# Patient Record
Sex: FEMALE | Race: WHITE | URBAN - METROPOLITAN AREA
[De-identification: names, ages, dates, MRNs, and addresses within clinical notes are randomized per-mention and may not be internally consistent; named-entity substitution may affect disease eponyms.]

---

## 2018-04-11 ENCOUNTER — HISTORICAL (OUTPATIENT)
Dept: ADMINISTRATIVE | Facility: HOSPITAL | Age: 48
End: 2018-04-11

## 2018-05-31 ENCOUNTER — HISTORICAL (OUTPATIENT)
Dept: ADMINISTRATIVE | Facility: HOSPITAL | Age: 48
End: 2018-05-31

## 2022-04-30 NOTE — ED PROVIDER NOTES
Patient:   ZAMZAM Brooks             MRN: [id number]            FIN: [id number]               Age:   48 years     Sex:  Female     :  1970   Associated Diagnoses:   Neck pain; Neck muscle spasm; Anxiety   Author:   Clinton Foreman      Basic Information   Additional information: Chief Complaint from Nursing Triage Note : Chief Complaint   2018 18:12 CDT      Chief Complaint           PT W CO CONTINUED NECK PAIN, SEEN LAST WK. NOT  IMPROVING W RX MEDS. WORSE AT NIGHT.  PT ALSO W INCREASE STRESS AT WORK CAUSING INCREASE IN ANXITY.  .      History of Present Illness   The patient presents with neck injury and neck pain.  The onset was 1  weeks ago and chronic.  The course/duration of symptoms is constant.  Location: Right posterior lower neck. Type of injury: none.  The location where the incident occurred was at home.  Radiating pain To the right upper extremity.  .  The character of symptoms is pain and radiating pain: to the right and upper extremity.  The degree at present is minimal.  There are exacerbating factors including movement and turning head.  The relieving factor is none.  Risk factors consist of none.  Prior episodes: chronic.  Therapy today: none.  Associated symptoms: denies headache, denies dizziness, denies focal weakness, denies altered sensation, denies nausea and denies vomiting.  49 YO female in ED with complaints of right sided posterior neck pain for the last week or so with intermittent radiation down the right upper extremity. States she has a history of herniated disc in her neck. Denies any injury or trauma, fever, chills, chest pain, SOB, abdominal pain, N/v/D, HA or dizziness. Also having anxiety due to extra stress at work. No other complaints. .        Review of Systems   Constitutional symptoms:  Negative except as documented in HPI.   Skin symptoms:  Negative except as documented in HPI.   Eye symptoms:  Negative except as documented in HPI.   ENMT symptoms:   Negative except as documented in HPI.   Respiratory symptoms:  Negative except as documented in HPI.   Cardiovascular symptoms:  Negative except as documented in HPI.   Gastrointestinal symptoms:  Negative except as documented in HPI.   Musculoskeletal symptoms:  Negative except as documented in HPI.   Neurologic symptoms:  Negative except as documented in HPI.   Psychiatric symptoms:  Negative except as documented in HPI.             Additional review of systems information: All other systems reviewed and otherwise negative.      Health Status   Allergies:    Allergic Reactions (Selected)  No Known Allergies,    Allergies (1) Active Reaction  No Known Allergies None Documented  .   Medications:  (Selected)   Prescriptions  Prescribed  baclofen 10 mg oral tablet: 10 mg = 1 tab(s), Oral, TID, PRN PRN muscle pain or spasms, # 20 tab(s), 0 Refill(s), Pharmacy: [facility]  [place]  diclofenac sodium 75 mg oral delayed release tablet: 75 mg = 1 tab(s), Oral, BID, PRN PRN pain, # 20 tab(s), 0 Refill(s), Pharmacy: [facility]  [place].      Past Medical/ Family/ Social History   Medical history:    No active or resolved past medical history items have been selected or recorded..   Surgical history:    I& D CYST.  BTL.  WISDOM TEETH..   Family history:    No family history items have been selected or recorded..   Social history:    Social & Psychosocial Habits    Alcohol  04/05/2018  Use: Never    Substance Abuse  04/05/2018  Use: Never    Tobacco  04/05/2018  Use: Current every day smoker    Type: Cigarettes    Tobacco use per day: 20  .   Problem list:    Active Problems (1)  Tobacco user   .      Physical Examination               Vital Signs      Vital Signs (last 24 hrs)_____  Last Charted___________  Temp Oral     36.7 DegC  (APR 11 18:12)  Heart Rate Peripheral   H 107bpm  (APR 11 18:12)  Resp Rate         18 br/min  (APR 11 18:12)  SBP      H 153mmHg  (APR 11 18:12)  DBP      H 91mmHg   (APR 11 18:12)  SpO2      98 %  (APR 11 18:12)  Weight      76 kg  (APR 11 18:12)  Height      167 cm  (APR 11 18:12)  BMI      27.25  (APR 11 18:12)  .   Vital Signs   4/11/2018 18:12 CDT      Temperature Oral          36.7 DegC                             Temperature Oral (calculated)             98.06 DegF                             Peripheral Pulse Rate     107 bpm  HI                             Respiratory Rate          18 br/min                             SpO2                      98 %                             Oxygen Therapy            Room air                             Systolic Blood Pressure   153 mmHg  HI                             Diastolic Blood Pressure  91 mmHg  HI  .   Measurements   4/11/2018 18:12 CDT      Weight Dosing             76 kg                             Weight Measured           76 kg                             Weight Measured and Calculated in Lbs     167.55 lb                             Height/Length Dosing      167 cm                             Height/Length Measured    167 cm                             Body Mass Index Measured  27.25 kg/m2  .   Basic Oxygen Information   4/11/2018 18:12 CDT      SpO2                      98 %                             Oxygen Therapy            Room air  .   General:  Alert, no acute distress.    Skin:  Warm, dry, pink, intact.    Head:  Normocephalic, atraumatic.    Neck:  Supple, trachea midline, full range of motion, painful range of motion, Tenderness: Bilateral, diffuse, mild, posterior, middle, lower, spasm noted to right trapezius muscle.    Eye:  Extraocular movements are intact, normal conjunctiva, vision grossly normal.    Ears, nose, mouth and throat:  Oral mucosa moist.   Cardiovascular:  Regular rate and rhythm, No murmur.    Respiratory:  Lungs are clear to auscultation, respirations are non-labored.    Gastrointestinal:  Soft, Nontender, Normal bowel sounds.    Back:  Nontender, Normal range of motion.    Musculoskeletal:   Normal ROM, normal strength.    Neurological:  Alert and oriented to person, place, time, and situation, No focal neurological deficit observed, CN II-XII intact.    Psychiatric:  Cooperative, appropriate mood & affect, normal judgment, non-suicidal.       Medical Decision Making   Differential Diagnosis:  Cervical strain, neck pain, cervical radiculopathy.    Documents reviewed:  Emergency department nurses' notes.   Orders  Launch Orders   Pharmacy:  methocarbamol 100 mg/mL injectable solution (Order): 300 mg, form: Injection, IM, Once-NOW, first dose 4/11/2018 18:32 CDT, stop date 4/11/2018 18:32 CDT  dexamethasone (Order): 4 mg, form: Injection, IM, Once-NOW, first dose 4/11/2018 18:32 CDT, stop date 4/11/2018 18:32 CDT  Radiology:  XR C-Spine 2 or 3 Views (Order): Stat, 4/11/2018 18:32 CDT, Pain, None, Wheelchair, Rad Type.   Radiology results:  04/11/2018 19:51; Marline BELTRAN, Clinton LEWIS; Negative; straightening of cervical lordosis and degenerative changes noted, no fracture or subluxation noted.      Reexamination/ Reevaluation   Time: 4/11/2018 19:50:00 .   Vital signs   Basic Oxygen Information   4/11/2018 18:12 CDT      SpO2                      98 %                             Oxygen Therapy            Room air     Notes: VSS, NAD, pt is non toxic or ill appearing, xray reviewed with pt, all questions answered, pt is stable and ready for discharge.      Impression and Plan   Diagnosis   Neck pain (GFM19-FG M54.2)   Neck muscle spasm (LID01-SC M62.838)   Anxiety (BCX09-AM F41.9)   Plan   Condition: Stable.    Disposition: Medically cleared, Discharged: Time  4/11/2018 19:52:00, to home.    Prescriptions: Launch prescriptions   Pharmacy:  Robaxin 500 mg oral tablet (Prescribe): 500 mg = 1 tab(s), Oral, TID, # 30 tab(s), 0 Refill(s), Pharmacy: [facility] [id number]  indomethacin 25 mg oral capsule (Prescribe): 25 mg = 1 cap(s), Oral, TID, PRN PRN for pain, # 30 cap(s), 0 Refill(s), Pharmacy: [facility] [id number], [facility] prescriptions   Pharmacy:  hydrOXYzine hydrochloride 50 mg oral tablet (Prescribe): 50 mg = 1 tab(s), Oral, QID, PRN PRN as needed for anxiety, # 20 tab(s), 0 Refill(s), Pharmacy: [facility] [id number].    Patient was given the following educational materials: Anxiety and Panic Attacks, Easy-to-Read (Custom), Heat Therapy, Easy-to-Read, Cervical Strain and Sprain with [facility] ([facility]), ED [facility] Work Excuse (Custom).    Follow up with: Report to ED if symptoms return / worsen; [facility] Within 2 to 4 weeks call to schedule an appointment; [facility] Within 2 to 4 weeks call to schedule an appointment.    Counseled: Patient, Family, Regarding diagnosis, Regarding diagnostic results, Regarding treatment plan, Regarding prescription, Patient indicated understanding of instructions.    Orders: Launch Orders   Pharmacy:  diclofenac sodium 75 mg oral delayed release tablet (Discontinue): 4/11/2018 20:04 CDT  baclofen 10 mg oral tablet (Discontinue): 4/11/2018 20:04 CDT  Admit/Transfer/Discharge:  Discharge (Order): Home.